# Patient Record
Sex: FEMALE | Race: WHITE | HISPANIC OR LATINO
[De-identification: names, ages, dates, MRNs, and addresses within clinical notes are randomized per-mention and may not be internally consistent; named-entity substitution may affect disease eponyms.]

---

## 2019-02-19 PROBLEM — Z00.00 ENCOUNTER FOR PREVENTIVE HEALTH EXAMINATION: Status: ACTIVE | Noted: 2019-02-19

## 2019-03-26 ENCOUNTER — APPOINTMENT (OUTPATIENT)
Dept: ANTEPARTUM | Facility: CLINIC | Age: 29
End: 2019-03-26
Payer: COMMERCIAL

## 2019-03-26 ENCOUNTER — ASOB RESULT (OUTPATIENT)
Age: 29
End: 2019-03-26

## 2019-03-26 PROCEDURE — 76811 OB US DETAILED SNGL FETUS: CPT

## 2019-06-12 ENCOUNTER — APPOINTMENT (OUTPATIENT)
Dept: ANTEPARTUM | Facility: CLINIC | Age: 29
End: 2019-06-12
Payer: COMMERCIAL

## 2019-06-12 ENCOUNTER — ASOB RESULT (OUTPATIENT)
Age: 29
End: 2019-06-12

## 2019-06-12 PROCEDURE — 76819 FETAL BIOPHYS PROFIL W/O NST: CPT

## 2019-06-12 PROCEDURE — 76816 OB US FOLLOW-UP PER FETUS: CPT

## 2019-07-03 ENCOUNTER — TRANSCRIPTION ENCOUNTER (OUTPATIENT)
Age: 29
End: 2019-07-03

## 2019-07-11 ENCOUNTER — APPOINTMENT (OUTPATIENT)
Dept: ANTEPARTUM | Facility: CLINIC | Age: 29
End: 2019-07-11

## 2019-07-26 ENCOUNTER — APPOINTMENT (OUTPATIENT)
Dept: ANTEPARTUM | Facility: CLINIC | Age: 29
End: 2019-07-26
Payer: COMMERCIAL

## 2019-07-26 ENCOUNTER — ASOB RESULT (OUTPATIENT)
Age: 29
End: 2019-07-26

## 2019-07-26 ENCOUNTER — APPOINTMENT (OUTPATIENT)
Dept: ANTEPARTUM | Facility: HOSPITAL | Age: 29
End: 2019-07-26

## 2019-07-26 ENCOUNTER — OUTPATIENT (OUTPATIENT)
Dept: OUTPATIENT SERVICES | Facility: HOSPITAL | Age: 29
LOS: 1 days | End: 2019-07-26

## 2019-07-26 ENCOUNTER — INPATIENT (INPATIENT)
Facility: HOSPITAL | Age: 29
LOS: 3 days | Discharge: HOME | End: 2019-07-30
Attending: OBSTETRICS & GYNECOLOGY | Admitting: OBSTETRICS & GYNECOLOGY

## 2019-07-26 VITALS
TEMPERATURE: 99 F | HEART RATE: 99 BPM | SYSTOLIC BLOOD PRESSURE: 140 MMHG | DIASTOLIC BLOOD PRESSURE: 87 MMHG | RESPIRATION RATE: 17 BRPM

## 2019-07-26 DIAGNOSIS — O26.899 OTHER SPECIFIED PREGNANCY RELATED CONDITIONS, UNSPECIFIED TRIMESTER: ICD-10-CM

## 2019-07-26 DIAGNOSIS — Z3A.00 WEEKS OF GESTATION OF PREGNANCY NOT SPECIFIED: ICD-10-CM

## 2019-07-26 DIAGNOSIS — O36.8390 MATERNAL CARE FOR ABNORMALITIES OF THE FETAL HEART RATE OR RHYTHM, UNSPECIFIED TRIMESTER, NOT APPLICABLE OR UNSPECIFIED: ICD-10-CM

## 2019-07-26 LAB
ALBUMIN SERPL ELPH-MCNC: 4 G/DL — SIGNIFICANT CHANGE UP (ref 3.3–5)
ALP SERPL-CCNC: 155 U/L — HIGH (ref 40–120)
ALT FLD-CCNC: 14 U/L — SIGNIFICANT CHANGE UP (ref 4–33)
ANION GAP SERPL CALC-SCNC: 13 MMO/L — SIGNIFICANT CHANGE UP (ref 7–14)
APPEARANCE UR: CLEAR — SIGNIFICANT CHANGE UP
APTT BLD: 24.8 SEC — LOW (ref 27.5–36.3)
AST SERPL-CCNC: 28 U/L — SIGNIFICANT CHANGE UP (ref 4–32)
BACTERIA # UR AUTO: NEGATIVE — SIGNIFICANT CHANGE UP
BASOPHILS # BLD AUTO: 0.04 K/UL — SIGNIFICANT CHANGE UP (ref 0–0.2)
BASOPHILS NFR BLD AUTO: 0.4 % — SIGNIFICANT CHANGE UP (ref 0–2)
BILIRUB SERPL-MCNC: 0.2 MG/DL — SIGNIFICANT CHANGE UP (ref 0.2–1.2)
BILIRUB UR-MCNC: NEGATIVE — SIGNIFICANT CHANGE UP
BLD GP AB SCN SERPL QL: NEGATIVE — SIGNIFICANT CHANGE UP
BLOOD UR QL VISUAL: NEGATIVE — SIGNIFICANT CHANGE UP
BUN SERPL-MCNC: 7 MG/DL — SIGNIFICANT CHANGE UP (ref 7–23)
CALCIUM SERPL-MCNC: 9.5 MG/DL — SIGNIFICANT CHANGE UP (ref 8.4–10.5)
CHLORIDE SERPL-SCNC: 103 MMOL/L — SIGNIFICANT CHANGE UP (ref 98–107)
CO2 SERPL-SCNC: 21 MMOL/L — LOW (ref 22–31)
COLOR SPEC: SIGNIFICANT CHANGE UP
CREAT ?TM UR-MCNC: 69.2 MG/DL — SIGNIFICANT CHANGE UP
CREAT SERPL-MCNC: 0.55 MG/DL — SIGNIFICANT CHANGE UP (ref 0.5–1.3)
EOSINOPHIL # BLD AUTO: 0.08 K/UL — SIGNIFICANT CHANGE UP (ref 0–0.5)
EOSINOPHIL NFR BLD AUTO: 0.8 % — SIGNIFICANT CHANGE UP (ref 0–6)
FIBRINOGEN PPP-MCNC: 540.8 MG/DL — HIGH (ref 350–510)
GLUCOSE SERPL-MCNC: 99 MG/DL — SIGNIFICANT CHANGE UP (ref 70–99)
GLUCOSE UR-MCNC: NEGATIVE — SIGNIFICANT CHANGE UP
HBV SURFACE AG SER-ACNC: NEGATIVE — SIGNIFICANT CHANGE UP
HCT VFR BLD CALC: 38.8 % — SIGNIFICANT CHANGE UP (ref 34.5–45)
HGB BLD-MCNC: 12.1 G/DL — SIGNIFICANT CHANGE UP (ref 11.5–15.5)
HYALINE CASTS # UR AUTO: NEGATIVE — SIGNIFICANT CHANGE UP
IMM GRANULOCYTES NFR BLD AUTO: 0.6 % — SIGNIFICANT CHANGE UP (ref 0–1.5)
INR BLD: 0.89 — SIGNIFICANT CHANGE UP (ref 0.88–1.17)
KETONES UR-MCNC: NEGATIVE — SIGNIFICANT CHANGE UP
LDH SERPL L TO P-CCNC: 304 U/L — HIGH (ref 135–225)
LEUKOCYTE ESTERASE UR-ACNC: SIGNIFICANT CHANGE UP
LYMPHOCYTES # BLD AUTO: 1.68 K/UL — SIGNIFICANT CHANGE UP (ref 1–3.3)
LYMPHOCYTES # BLD AUTO: 16.1 % — SIGNIFICANT CHANGE UP (ref 13–44)
MCHC RBC-ENTMCNC: 25.5 PG — LOW (ref 27–34)
MCHC RBC-ENTMCNC: 31.2 % — LOW (ref 32–36)
MCV RBC AUTO: 81.9 FL — SIGNIFICANT CHANGE UP (ref 80–100)
MONOCYTES # BLD AUTO: 0.9 K/UL — SIGNIFICANT CHANGE UP (ref 0–0.9)
MONOCYTES NFR BLD AUTO: 8.6 % — SIGNIFICANT CHANGE UP (ref 2–14)
NEUTROPHILS # BLD AUTO: 7.69 K/UL — HIGH (ref 1.8–7.4)
NEUTROPHILS NFR BLD AUTO: 73.5 % — SIGNIFICANT CHANGE UP (ref 43–77)
NITRITE UR-MCNC: NEGATIVE — SIGNIFICANT CHANGE UP
NRBC # FLD: 0 K/UL — SIGNIFICANT CHANGE UP (ref 0–0)
PH UR: 6.5 — SIGNIFICANT CHANGE UP (ref 5–8)
PLATELET # BLD AUTO: 260 K/UL — SIGNIFICANT CHANGE UP (ref 150–400)
PMV BLD: 13 FL — SIGNIFICANT CHANGE UP (ref 7–13)
POTASSIUM SERPL-MCNC: 4.3 MMOL/L — SIGNIFICANT CHANGE UP (ref 3.5–5.3)
POTASSIUM SERPL-SCNC: 4.3 MMOL/L — SIGNIFICANT CHANGE UP (ref 3.5–5.3)
PROT SERPL-MCNC: 7.6 G/DL — SIGNIFICANT CHANGE UP (ref 6–8.3)
PROT UR-MCNC: 10.1 MG/DL — SIGNIFICANT CHANGE UP
PROT UR-MCNC: NEGATIVE — SIGNIFICANT CHANGE UP
PROTHROM AB SERPL-ACNC: 9.9 SEC — SIGNIFICANT CHANGE UP (ref 9.8–13.1)
RBC # BLD: 4.74 M/UL — SIGNIFICANT CHANGE UP (ref 3.8–5.2)
RBC # FLD: 17.3 % — HIGH (ref 10.3–14.5)
RBC CASTS # UR COMP ASSIST: SIGNIFICANT CHANGE UP (ref 0–?)
RH IG SCN BLD-IMP: POSITIVE — SIGNIFICANT CHANGE UP
SODIUM SERPL-SCNC: 137 MMOL/L — SIGNIFICANT CHANGE UP (ref 135–145)
SP GR SPEC: 1.01 — SIGNIFICANT CHANGE UP (ref 1–1.04)
SQUAMOUS # UR AUTO: SIGNIFICANT CHANGE UP
URATE SERPL-MCNC: 4 MG/DL — SIGNIFICANT CHANGE UP (ref 2.5–7)
UROBILINOGEN FLD QL: NORMAL — SIGNIFICANT CHANGE UP
WBC # BLD: 10.45 K/UL — SIGNIFICANT CHANGE UP (ref 3.8–10.5)
WBC # FLD AUTO: 10.45 K/UL — SIGNIFICANT CHANGE UP (ref 3.8–10.5)
WBC UR QL: SIGNIFICANT CHANGE UP (ref 0–?)

## 2019-07-26 PROCEDURE — 76816 OB US FOLLOW-UP PER FETUS: CPT

## 2019-07-26 PROCEDURE — 76818 FETAL BIOPHYS PROFILE W/NST: CPT | Mod: 26

## 2019-07-26 RX ORDER — AMPICILLIN TRIHYDRATE 250 MG
CAPSULE ORAL
Refills: 0 | Status: DISCONTINUED | OUTPATIENT
Start: 2019-07-26 | End: 2019-07-28

## 2019-07-26 RX ORDER — AMPICILLIN TRIHYDRATE 250 MG
2 CAPSULE ORAL ONCE
Refills: 0 | Status: COMPLETED | OUTPATIENT
Start: 2019-07-26 | End: 2019-07-26

## 2019-07-26 RX ORDER — AMPICILLIN TRIHYDRATE 250 MG
1 CAPSULE ORAL EVERY 4 HOURS
Refills: 0 | Status: DISCONTINUED | OUTPATIENT
Start: 2019-07-27 | End: 2019-07-28

## 2019-07-26 RX ORDER — OXYTOCIN 10 UNIT/ML
333.33 VIAL (ML) INJECTION
Qty: 20 | Refills: 0 | Status: DISCONTINUED | OUTPATIENT
Start: 2019-07-26 | End: 2019-07-28

## 2019-07-26 RX ORDER — BUTORPHANOL TARTRATE 2 MG/ML
2 INJECTION, SOLUTION INTRAMUSCULAR; INTRAVENOUS ONCE
Refills: 0 | Status: DISCONTINUED | OUTPATIENT
Start: 2019-07-26 | End: 2019-07-26

## 2019-07-26 RX ORDER — SODIUM CHLORIDE 9 MG/ML
1000 INJECTION, SOLUTION INTRAVENOUS
Refills: 0 | Status: DISCONTINUED | OUTPATIENT
Start: 2019-07-26 | End: 2019-07-28

## 2019-07-26 RX ADMIN — BUTORPHANOL TARTRATE 2 MILLIGRAM(S): 2 INJECTION, SOLUTION INTRAMUSCULAR; INTRAVENOUS at 21:36

## 2019-07-26 RX ADMIN — Medication 216 GRAM(S): at 20:19

## 2019-07-26 NOTE — OB PROVIDER H&P - ASSESSMENT
28 y.o.  @ 38.3 weeks sent from ATU for prolonged monitoring for 3 decels while on NST. Pt reports good fetal movement. Denies vb, lof, contractions    Labile BPs 130-140/80s  HELLP labs ordered and wnl    tracing reviewed with Dr. Green  Cat 2 fhr tracing, fhr 155 with mod variability, accels, late decels  irregular contractions on toco  pt admitted for po cytotec induction for cat 2 tracing  routine orders placed

## 2019-07-26 NOTE — OB PROVIDER TRIAGE NOTE - HISTORY OF PRESENT ILLNESS
28 y.o.  @ 38.3 weeks sent from ATU for prolonged monitoring for 3 decels while on NST. Pt reports good fetal movement. Denies vb, lof, contractions

## 2019-07-26 NOTE — OB PROVIDER TRIAGE NOTE - NSOBPROVIDERNOTE_OBGYN_ALL_OB_FT
Labile BPs 130-140/80s  HELLP labs ordered  NST in progress Labile BPs 130-140/80s  HELLP labs ordered  NST in progress    1810:  tracing reviewed with Dr. Green  Cat 2 fhr tracing, fhr 155 with mod variability, accels, late decels  irregular contractions on toco  pt admitted for po cytotec induction for cat 2 tracing  routine orders placed

## 2019-07-26 NOTE — CHART NOTE - NSCHARTNOTEFT_GEN_A_CORE
R4 OB Progress Note    Patient seen and evaluated at bedside.  Denies complaints    T(C): 37 (07-26-19 @ 16:30), Max: 37 (07-26-19 @ 16:30)  HR: 78 (07-26-19 @ 21:17) (78 - 121)  BP: 128/67 (07-26-19 @ 20:19) (108/56 - 144/84)  RR: 18 (07-26-19 @ 19:20) (17 - 18)  SpO2: 98% (07-26-19 @ 21:17) (90% - 100%)    SVE: 1/30/-3   CB placed w/ 60 cc in each balloon    EFM:  150/mod v ar/+accel, -decel  [place]:  ctx q5 mins     A/P 28y P0 admitted for IOL for cateogry 2 tracing. currently category 1   -Labor:  CB placed.  for PO cytotec to start now  -Fetus: category 1 tracing  -GBS positive: continue ampicillin ppx  -Analgesia: for stadol for pain    Valerie Morgan PGY-4  d/w Dr. Green

## 2019-07-26 NOTE — OB PROVIDER TRIAGE NOTE - NSHPLABSRESULTS_GEN_ALL_CORE
12.1   10.45 )-----------( 260      ( 26 Jul 2019 17:11 )             38.8     07-26    137  |  103  |  7   ----------------------------<  99  4.3   |  21<L>  |  0.55    Ca    9.5      26 Jul 2019 17:11    TPro  7.6  /  Alb  4.0  /  TBili  0.2  /  DBili  x   /  AST  28  /  ALT  14  /  AlkPhos  155<H>  07-26    PT/INR - ( 26 Jul 2019 17:11 )   PT: 9.9 SEC;   INR: 0.89          PTT - ( 26 Jul 2019 17:11 )  PTT:24.8 SEC    PCR 0.14

## 2019-07-27 ENCOUNTER — TRANSCRIPTION ENCOUNTER (OUTPATIENT)
Age: 29
End: 2019-07-27

## 2019-07-27 DIAGNOSIS — O36.8390 MATERNAL CARE FOR ABNORMALITIES OF THE FETAL HEART RATE OR RHYTHM, UNSPECIFIED TRIMESTER, NOT APPLICABLE OR UNSPECIFIED: ICD-10-CM

## 2019-07-27 LAB — RH IG SCN BLD-IMP: POSITIVE — SIGNIFICANT CHANGE UP

## 2019-07-27 RX ORDER — OXYTOCIN 10 UNIT/ML
2 VIAL (ML) INJECTION
Qty: 30 | Refills: 0 | Status: DISCONTINUED | OUTPATIENT
Start: 2019-07-27 | End: 2019-07-28

## 2019-07-27 RX ADMIN — Medication 208 GRAM(S): at 17:33

## 2019-07-27 RX ADMIN — Medication 208 GRAM(S): at 13:27

## 2019-07-27 RX ADMIN — Medication 208 GRAM(S): at 09:22

## 2019-07-27 RX ADMIN — Medication 2 MILLIUNIT(S)/MIN: at 23:22

## 2019-07-27 RX ADMIN — SODIUM CHLORIDE 125 MILLILITER(S): 9 INJECTION, SOLUTION INTRAVENOUS at 05:48

## 2019-07-27 RX ADMIN — Medication 208 GRAM(S): at 00:10

## 2019-07-27 RX ADMIN — SODIUM CHLORIDE 125 MILLILITER(S): 9 INJECTION, SOLUTION INTRAVENOUS at 09:23

## 2019-07-27 RX ADMIN — Medication 208 GRAM(S): at 21:30

## 2019-07-27 RX ADMIN — Medication 208 GRAM(S): at 05:20

## 2019-07-27 RX ADMIN — BUTORPHANOL TARTRATE 2 MILLIGRAM(S): 2 INJECTION, SOLUTION INTRAMUSCULAR; INTRAVENOUS at 00:03

## 2019-07-27 NOTE — CHART NOTE - NSCHARTNOTEFT_GEN_A_CORE
R4 OB Tracing Note    T(C): 37 (07-26-19 @ 16:30), Max: 37 (07-26-19 @ 16:30)  HR: 91 (07-27-19 @ 03:08) (50 - 124)  BP: 120/74 (07-27-19 @ 01:48) (107/56 - 144/84)  RR: 18 (07-26-19 @ 19:20) (17 - 18)  SpO2: 97% (07-27-19 @ 03:08) (90% - 100%)    EFM: 140/mod yogesh/+accel,-decel  [place]: ctx irregular and infrequent    A/P 28y P0 admitted for IOL for category 2 tracing, now category 1   -Labor: continue PO cytotec and cervical ballon  -Fetus: cateogry 1 tracing  -GBS positive: continue ampicillin     Valerie Hirschhorn PGY-4

## 2019-07-27 NOTE — PROGRESS NOTE ADULT - SUBJECTIVE AND OBJECTIVE BOX
INTERVAL HPI/OVERNIGHT EVENTS: Pt seen and examined at bedside. Feels mild ctx     MEDICATIONS  (STANDING):  ampicillin  IVPB      ampicillin  IVPB 1 Gram(s) IV Intermittent every 4 hours  lactated ringers. 1000 milliLiter(s) (125 mL/Hr) IV Continuous <Continuous>  misoprostol Oral Solution 60 MICROGram(s) Oral every 2 hours  oxytocin Infusion 333.333 milliUNIT(s)/Min (1000 mL/Hr) IV Continuous <Continuous>    Allergies  No Known Allergies    Vital Signs Last 24 Hrs  T(C): 36.6 (2019 03:16), Max: 37 (2019 16:30)  T(F): 97.88 (2019 03:16), Max: 98.6 (2019 16:30)  HR: 90 (2019 11:52) (50 - 124)  BP: 129/80 (2019 10:13) (99/57 - 144/84)  BP(mean): --  RR: 18 (2019 19:20) (17 - 18)  SpO2: 92% (2019 11:53) (81% - 100%)    PHYSICAL EXAM:    GA: NAD, A+0 x 3    Abd: soft, nontender, gravid EFW 8lb  Extremities: no swelling or calf tenderness, reflexes +2 bilaterally    EFM +moderate variability +accels -decels ctx irregular         LABS:                        12.1   10.45 )-----------( 260      ( 2019 17:11 )             38.8         137  |  103  |  7   ----------------------------<  99  4.3   |  21<L>  |  0.55    Ca    9.5      2019 17:11    TPro  7.6  /  Alb  4.0  /  TBili  0.2  /  DBili  x   /  AST  28  /  ALT  14  /  AlkPhos  155<H>      PT/INR - ( 2019 17:11 )   PT: 9.9 SEC;   INR: 0.89          PTT - ( 2019 17:11 )  PTT:24.8 SEC  Urinalysis Basic - ( 2019 17:11 )    Color: LIGHT YELLOW / Appearance: CLEAR / S.014 / pH: 6.5  Gluc: NEGATIVE / Ketone: NEGATIVE  / Bili: NEGATIVE / Urobili: NORMAL   Blood: NEGATIVE / Protein: NEGATIVE / Nitrite: NEGATIVE   Leuk Esterase: SMALL / RBC: 0-2 / WBC 0-2   Sq Epi: FEW / Non Sq Epi: x / Bacteria: NEGATIVE

## 2019-07-27 NOTE — PROGRESS NOTE ADULT - ASSESSMENT
IMP  IUP 38 weeks  mild PEC, 24hr protein 375, BP stable  Induction for cat 2 tracing  PLAN  maintain cervical balloon and continue po cytotec  PAUL Aguillon

## 2019-07-27 NOTE — CHART NOTE - NSCHARTNOTEFT_GEN_A_CORE
R1 Note: Pt examined at bedside for CB removal and SVE. 60cc removed from each balloon and CB removed without incident. Pt tolerated well.    SVE: 4/80/-3  FHT: 155 bpm, mod variability, +acel, -decel  [place]: irregular    VS  T(C): 36.1 (07-27-19 @ 19:48)  HR: 96 (07-27-19 @ 18:19)  BP: 128/81 (07-27-19 @ 17:43)  RR: --  SpO2: 95% (07-27-19 @ 18:19)    IOL for Cat2 tracing  GBS+ on amp  spCB, spPO, 4pit, 4epi    d/w Dr. Henna Heredia-Raymon covering  [facility] PGY1

## 2019-07-28 RX ORDER — LANOLIN
1 OINTMENT (GRAM) TOPICAL EVERY 6 HOURS
Refills: 0 | Status: DISCONTINUED | OUTPATIENT
Start: 2019-07-28 | End: 2019-07-30

## 2019-07-28 RX ORDER — DIBUCAINE 1 %
1 OINTMENT (GRAM) RECTAL EVERY 6 HOURS
Refills: 0 | Status: DISCONTINUED | OUTPATIENT
Start: 2019-07-28 | End: 2019-07-30

## 2019-07-28 RX ORDER — DIPHENHYDRAMINE HCL 50 MG
25 CAPSULE ORAL EVERY 6 HOURS
Refills: 0 | Status: DISCONTINUED | OUTPATIENT
Start: 2019-07-28 | End: 2019-07-30

## 2019-07-28 RX ORDER — IBUPROFEN 200 MG
600 TABLET ORAL EVERY 6 HOURS
Refills: 0 | Status: COMPLETED | OUTPATIENT
Start: 2019-07-28 | End: 2020-06-25

## 2019-07-28 RX ORDER — GLYCERIN ADULT
1 SUPPOSITORY, RECTAL RECTAL AT BEDTIME
Refills: 0 | Status: DISCONTINUED | OUTPATIENT
Start: 2019-07-28 | End: 2019-07-30

## 2019-07-28 RX ORDER — KETOROLAC TROMETHAMINE 30 MG/ML
30 SYRINGE (ML) INJECTION ONCE
Refills: 0 | Status: DISCONTINUED | OUTPATIENT
Start: 2019-07-28 | End: 2019-07-28

## 2019-07-28 RX ORDER — SODIUM CHLORIDE 9 MG/ML
3 INJECTION INTRAMUSCULAR; INTRAVENOUS; SUBCUTANEOUS EVERY 8 HOURS
Refills: 0 | Status: DISCONTINUED | OUTPATIENT
Start: 2019-07-28 | End: 2019-07-30

## 2019-07-28 RX ORDER — DOCUSATE SODIUM 100 MG
100 CAPSULE ORAL
Refills: 0 | Status: DISCONTINUED | OUTPATIENT
Start: 2019-07-28 | End: 2019-07-30

## 2019-07-28 RX ORDER — PRAMOXINE HYDROCHLORIDE 150 MG/15G
1 AEROSOL, FOAM RECTAL EVERY 4 HOURS
Refills: 0 | Status: DISCONTINUED | OUTPATIENT
Start: 2019-07-28 | End: 2019-07-30

## 2019-07-28 RX ORDER — OXYTOCIN 10 UNIT/ML
333.33 VIAL (ML) INJECTION
Qty: 20 | Refills: 0 | Status: DISCONTINUED | OUTPATIENT
Start: 2019-07-28 | End: 2019-07-28

## 2019-07-28 RX ORDER — BENZOCAINE 10 %
1 GEL (GRAM) MUCOUS MEMBRANE EVERY 6 HOURS
Refills: 0 | Status: DISCONTINUED | OUTPATIENT
Start: 2019-07-28 | End: 2019-07-30

## 2019-07-28 RX ORDER — IBUPROFEN 200 MG
600 TABLET ORAL EVERY 6 HOURS
Refills: 0 | Status: DISCONTINUED | OUTPATIENT
Start: 2019-07-28 | End: 2019-07-30

## 2019-07-28 RX ORDER — HYDROCORTISONE 1 %
1 OINTMENT (GRAM) TOPICAL EVERY 6 HOURS
Refills: 0 | Status: DISCONTINUED | OUTPATIENT
Start: 2019-07-28 | End: 2019-07-30

## 2019-07-28 RX ORDER — OXYCODONE HYDROCHLORIDE 5 MG/1
5 TABLET ORAL ONCE
Refills: 0 | Status: DISCONTINUED | OUTPATIENT
Start: 2019-07-28 | End: 2019-07-30

## 2019-07-28 RX ORDER — TETANUS TOXOID, REDUCED DIPHTHERIA TOXOID AND ACELLULAR PERTUSSIS VACCINE, ADSORBED 5; 2.5; 8; 8; 2.5 [IU]/.5ML; [IU]/.5ML; UG/.5ML; UG/.5ML; UG/.5ML
0.5 SUSPENSION INTRAMUSCULAR ONCE
Refills: 0 | Status: DISCONTINUED | OUTPATIENT
Start: 2019-07-28 | End: 2019-07-30

## 2019-07-28 RX ORDER — SIMETHICONE 80 MG/1
80 TABLET, CHEWABLE ORAL EVERY 4 HOURS
Refills: 0 | Status: DISCONTINUED | OUTPATIENT
Start: 2019-07-28 | End: 2019-07-30

## 2019-07-28 RX ORDER — AER TRAVELER 0.5 G/1
1 SOLUTION RECTAL; TOPICAL EVERY 4 HOURS
Refills: 0 | Status: DISCONTINUED | OUTPATIENT
Start: 2019-07-28 | End: 2019-07-30

## 2019-07-28 RX ORDER — OXYCODONE HYDROCHLORIDE 5 MG/1
5 TABLET ORAL
Refills: 0 | Status: DISCONTINUED | OUTPATIENT
Start: 2019-07-28 | End: 2019-07-30

## 2019-07-28 RX ORDER — MAGNESIUM HYDROXIDE 400 MG/1
30 TABLET, CHEWABLE ORAL
Refills: 0 | Status: DISCONTINUED | OUTPATIENT
Start: 2019-07-28 | End: 2019-07-30

## 2019-07-28 RX ORDER — ACETAMINOPHEN 500 MG
975 TABLET ORAL
Refills: 0 | Status: DISCONTINUED | OUTPATIENT
Start: 2019-07-28 | End: 2019-07-30

## 2019-07-28 RX ADMIN — Medication 600 MILLIGRAM(S): at 12:54

## 2019-07-28 RX ADMIN — Medication 600 MILLIGRAM(S): at 20:29

## 2019-07-28 RX ADMIN — Medication 600 MILLIGRAM(S): at 13:54

## 2019-07-28 RX ADMIN — SODIUM CHLORIDE 125 MILLILITER(S): 9 INJECTION, SOLUTION INTRAVENOUS at 06:07

## 2019-07-28 RX ADMIN — Medication 1 APPLICATION(S): at 12:56

## 2019-07-28 RX ADMIN — SODIUM CHLORIDE 3 MILLILITER(S): 9 INJECTION INTRAMUSCULAR; INTRAVENOUS; SUBCUTANEOUS at 17:11

## 2019-07-28 RX ADMIN — PRAMOXINE HYDROCHLORIDE 1 APPLICATION(S): 150 AEROSOL, FOAM RECTAL at 12:56

## 2019-07-28 RX ADMIN — Medication 1000 MILLIUNIT(S)/MIN: at 06:07

## 2019-07-28 RX ADMIN — Medication 30 MILLIGRAM(S): at 06:12

## 2019-07-28 RX ADMIN — Medication 30 MILLIGRAM(S): at 06:33

## 2019-07-28 RX ADMIN — SODIUM CHLORIDE 3 MILLILITER(S): 9 INJECTION INTRAMUSCULAR; INTRAVENOUS; SUBCUTANEOUS at 23:04

## 2019-07-28 RX ADMIN — Medication 600 MILLIGRAM(S): at 21:30

## 2019-07-28 RX ADMIN — Medication 208 GRAM(S): at 01:46

## 2019-07-28 RX ADMIN — Medication 2 MILLIUNIT(S)/MIN: at 04:58

## 2019-07-28 RX ADMIN — Medication 975 MILLIGRAM(S): at 17:16

## 2019-07-28 RX ADMIN — Medication 975 MILLIGRAM(S): at 18:03

## 2019-07-28 NOTE — DISCHARGE NOTE OB - CARE PROVIDER_API CALL
Savita Green)  Obstetrics and Gynecology  [address] [place] [address]  Phone: [phone]  Fax: [phone]  Follow Up Time:

## 2019-07-28 NOTE — OB PROVIDER DELIVERY SUMMARY - NSPROVIDERDELIVERYNOTE_OBGYN_ALL_OB_FT
Patient FD and pushing.   live male over intact perineum.  Nuchal cord easily reduced.  Peds in attendance for category 2 tracing.  Apgars 9+9.  Placenta spontaneous intact.  2nd degree laceration repair with 2-0 chromic and vicryl under epidural and local.  .  Mother and baby in stable condition.  PAUL Aguillon

## 2019-07-28 NOTE — CHART NOTE - NSCHARTNOTEFT_GEN_A_CORE
R1 Note: Called to bedside for Cat 2 tracing    SVE: deferred  FHT: 150 bpm, mod variability, +acel, +late decel, Cat 2 tracing  [place]: irregular    VS  T(C): 36.1 (07-27-19 @ 23:30)  HR: 94 (07-28-19 @ 02:41)  BP: 126/74 (07-28-19 @ 02:41)  RR: --  SpO2: 100% (07-28-19 @ 02:37)    Repositioning, O2, 1L bolus  Pit off    Dr. Aguillon aware  Leighann Mccain PGY1

## 2019-07-28 NOTE — OB NEONATOLOGY/PEDIATRICIAN DELIVERY SUMMARY - NSPEDSNEONOTESA_OBGYN_ALL_OB_FT
Baby is a 38.3 week GA male born to a 27 y/o  mother via . Peds called for cat II tracing. Maternal history uncomplicated. Pregnancy uncomplicated. Maternal blood type A+. Prenatal labs neg, neg, NR, immune. GBS positive on . SROM at 02:35 (<18hrs) with clear fluid. Baby born vigorous and crying spontaneously. Warmed, dried, stimulated. Apgars 9/9. Mother wants hep B and circ.

## 2019-07-28 NOTE — DISCHARGE NOTE OB - HOSPITAL COURSE
patient admitted with category 2 tracing at term for induction   h/o mild PEC, 24 hour urine protein 375  cytotec, cervical balloon, pitocin   live male

## 2019-07-28 NOTE — CHART NOTE - NSCHARTNOTEFT_GEN_A_CORE
R1 Tracing Note for late decels    SVE: deferred  FHT: 150 bpm, mod variability, +acel, some late decel, Cat II overall reassuring  [place]: irregular    LR 300cc bolus, O2, repositioning  Tracing overall reassuring    d/w Dr. Newton Mccain PGY1

## 2019-07-28 NOTE — CHART NOTE - NSCHARTNOTEFT_GEN_A_CORE
R1 Note: Pt evaluated at bedside feeling increased pressure and with bloody show    SVE: 10/100/0  FHT: 150 bpm, mod variability, +acel, +early decel  [place]: irregular    VS  T(C): 36.1 (07-27-19 @ 23:30)  HR: 130 (07-28-19 @ 02:52)  BP: 126/74 (07-28-19 @ 02:41)  RR: --  SpO2: 100% (07-28-19 @ 02:52)    SROM@250a->blood tinged    Dr. Aguillon aware  [facility] PGY[facility]

## 2019-07-28 NOTE — CHART NOTE - NSCHARTNOTEFT_GEN_A_CORE
R1 Note: Pt evaluated at bedside feeling stronger ctx    SVE: 5-6/80/-3  FHT: 145 bpm, mod variability, +acel, +early/variably decel, Cat 2   [place]: irregular    VS  T(C): 36.1 (07-27-19 @ 23:30)  HR: 98 (07-28-19 @ 02:22)  BP: 134/86 (07-28-19 @ 02:11)  RR: --  SpO2: 100% (07-28-19 @ 02:22)    Cat 2 overall reassuring  Pit@1130p  spCB/PO    to be d/w Dr. Henna Mccain PGY1

## 2019-07-28 NOTE — DISCHARGE NOTE OB - PATIENT PORTAL LINK FT
You can access the [facility] Patient Portal, offered by [facility], by registering with the following website: [email or web address]

## 2019-07-29 RX ADMIN — Medication 600 MILLIGRAM(S): at 12:40

## 2019-07-29 RX ADMIN — Medication 100 MILLIGRAM(S): at 20:15

## 2019-07-29 RX ADMIN — Medication 600 MILLIGRAM(S): at 20:45

## 2019-07-29 RX ADMIN — Medication 600 MILLIGRAM(S): at 20:15

## 2019-07-29 RX ADMIN — Medication 975 MILLIGRAM(S): at 01:01

## 2019-07-29 RX ADMIN — Medication 975 MILLIGRAM(S): at 08:25

## 2019-07-29 RX ADMIN — Medication 975 MILLIGRAM(S): at 00:29

## 2019-07-29 RX ADMIN — Medication 975 MILLIGRAM(S): at 18:25

## 2019-07-29 RX ADMIN — Medication 600 MILLIGRAM(S): at 13:40

## 2019-07-29 RX ADMIN — Medication 600 MILLIGRAM(S): at 04:30

## 2019-07-29 RX ADMIN — Medication 975 MILLIGRAM(S): at 09:25

## 2019-07-29 RX ADMIN — Medication 600 MILLIGRAM(S): at 03:59

## 2019-07-29 RX ADMIN — Medication 975 MILLIGRAM(S): at 17:25

## 2019-07-29 NOTE — PROGRESS NOTE ADULT - SUBJECTIVE AND OBJECTIVE BOX
SUBJECTIVE:    Pain: Controlled    Complaints: None    MILESTONES:     Alert and oriented x 3  [x  ]  Out of bed /ambulating [  x]    Voiding [x  ]  Due to void [  ]    Tolerating a regular diet.    Infant feeding:   Breast [  ]   Bottle [  ]     Both [ X ]                         Feeding related issues or concerns:    Objective   T(C): 36.6 (19 @ 05:52), Max: 36.8 (19 @ 13:31)  HR: 85 (19 @ 05:52) (85 - 101)  BP: 105/67 (19 @ 05:52) (105/67 - 123/79)  RR: 17 (19 @ 05:52) (17 - 18)  SpO2: 98% (19 @ 05:52) (97% - 100%)  Wt(kg): --        Blood Type: A Positive            MEDICATIONS  (STANDING):  acetaminophen   Tablet .. 975 milliGRAM(s) Oral <User Schedule>  diphtheria/tetanus/pertussis (acellular) Vaccine (ADAcel) 0.5 milliLiter(s) IntraMuscular once  ibuprofen  Tablet. 600 milliGRAM(s) Oral every 6 hours  prenatal multivitamin 1 Tablet(s) Oral daily  sodium chloride 0.9% lock flush 3 milliLiter(s) IV Push every 8 hours    MEDICATIONS  (PRN):  benzocaine 20%/menthol 0.5% Spray 1 Spray(s) Topical every 6 hours PRN for Perineal discomfort  dibucaine 1% Ointment 1 Application(s) Topical every 6 hours PRN Perineal discomfort  diphenhydrAMINE 25 milliGRAM(s) Oral every 6 hours PRN Pruritus  docusate sodium 100 milliGRAM(s) Oral two times a day PRN For stool softening  glycerin Suppository - Adult 1 Suppository(s) Rectal at bedtime PRN Constipation  hydrocortisone 1% Cream 1 Application(s) Topical every 6 hours PRN Moderate Pain (4-6)  lanolin Ointment 1 Application(s) Topical every 6 hours PRN nipple soreness  magnesium hydroxide Suspension 30 milliLiter(s) Oral two times a day PRN Constipation  oxyCODONE    IR 5 milliGRAM(s) Oral every 3 hours PRN Moderate to Severe Pain (4-10)  oxyCODONE    IR 5 milliGRAM(s) Oral once PRN Moderate to Severe Pain (4-10)  pramoxine 1%/zinc 5% Cream 1 Application(s) Topical every 4 hours PRN Moderate Pain (4-6)  simethicone 80 milliGRAM(s) Chew every 4 hours PRN Gas  witch hazel Pads 1 Application(s) Topical every 4 hours PRN Perineal discomfort      ASSESSMENT:      28y      G  1  P 1001       PP Day #1       Delivery:   [X  ]             [   ]  EBL - 300, Hx. Labile B/P's.  HELLP - wnl ()    Assisted delivery  :    Vacuum   [  ]   Forceps)  [  ]    Indication for assisted delivery: Tracing Abn [  ]     Maternal Exhaustion [  ]     Other [  ]    Past medical history significant for HPI:  28 y.o.  @ 38.3 weeks sent from ATU for prolonged monitoring for 3 decels while on NST. Pt reports good fetal movement. Denies vb, lof, contractions (2019 18:25)      Current Issues:     Breasts: Soft [x  ]    Engorged [  ]  Nipples:  Abdomen: Soft [ x ]  Nontender [  ]  Nondistended [  ]   Distended [  ]    Vagina: Lochia   Mod [x  ]      Scant [  ]  Heavy  [  ]    Perineum:  Intact [  ]   Laceration   [ X ]   Type of laceration [  2nd degree   ]    Episiotomy [  ]    Type of episiotomy  [              ]    Lower extremities: Edema  [  ] noted bilaterally .  Nontender Que  [  ]  Negative Jonathan's sign [  ] Positive pedal pulses [  ]    Other relevant physical exam findings;      PLAN:    Plan: Increase ambulation, analgesia PRN and pain medication  protocol standing oxycodone, ibuprofen and acetaminophen.    Diet: Continue regular diet    Labs:    Continue routine postpartum care.     Discharge Planning [x  ]    For  Discharge Today  [   ]    Consults :  Social Work [  ]     Lactation [x  ]    Other [      ]

## 2019-07-29 NOTE — PROGRESS NOTE ADULT - SUBJECTIVE AND OBJECTIVE BOX
Anesthesia Post-op Note    POD#1 S/P     Patient is doing well.  OOBAA.  Pain is tolerable.  No complaints of Headache. Pt reports able to void.  No residual anesthetic issues or complications noted.    T(C): 36.9 (19 @ 10:37), Max: 36.9 (19 @ 10:37)  HR: 88 (19 @ 10:37) (85 - 101)  BP: 115/85 (19 @ 10:37) (105/67 - 123/79)  RR: 16 (19 @ 10:37) (16 - 18)  SpO2: 100% (19 @ 10:37) (97% - 100%)

## 2019-07-30 VITALS
SYSTOLIC BLOOD PRESSURE: 104 MMHG | DIASTOLIC BLOOD PRESSURE: 61 MMHG | RESPIRATION RATE: 17 BRPM | TEMPERATURE: 98 F | OXYGEN SATURATION: 99 % | HEART RATE: 88 BPM

## 2019-07-30 LAB — T PALLIDUM AB TITR SER: NEGATIVE — SIGNIFICANT CHANGE UP

## 2019-07-30 RX ORDER — IBUPROFEN 200 MG
1 TABLET ORAL
Qty: 0 | Refills: 0 | DISCHARGE
Start: 2019-07-30

## 2019-07-30 RX ORDER — ACETAMINOPHEN 500 MG
3 TABLET ORAL
Qty: 0 | Refills: 0 | DISCHARGE
Start: 2019-07-30

## 2019-07-30 RX ADMIN — Medication 975 MILLIGRAM(S): at 00:36

## 2019-07-30 RX ADMIN — Medication 600 MILLIGRAM(S): at 05:25

## 2019-07-30 RX ADMIN — Medication 975 MILLIGRAM(S): at 00:06

## 2019-07-30 RX ADMIN — Medication 975 MILLIGRAM(S): at 10:07

## 2019-07-30 RX ADMIN — Medication 600 MILLIGRAM(S): at 05:55

## 2019-07-30 RX ADMIN — Medication 975 MILLIGRAM(S): at 09:07

## 2019-08-06 DIAGNOSIS — O99.013 ANEMIA COMPLICATING PREGNANCY, THIRD TRIMESTER: ICD-10-CM

## 2019-08-06 DIAGNOSIS — O26.843 UTERINE SIZE-DATE DISCREPANCY, THIRD TRIMESTER: ICD-10-CM

## 2019-08-06 DIAGNOSIS — Z3A.38 38 WEEKS GESTATION OF PREGNANCY: ICD-10-CM

## 2024-01-12 NOTE — OB RN DELIVERY SUMMARY - NS_[place]_OBGYN_ALL_OB
Addended by: OTTO SPRAGUE on: 1/12/2024 11:33 AM     Modules accepted: Orders    
Was done through completion of first feed